# Patient Record
Sex: MALE | Race: WHITE | Employment: FULL TIME | URBAN - METROPOLITAN AREA
[De-identification: names, ages, dates, MRNs, and addresses within clinical notes are randomized per-mention and may not be internally consistent; named-entity substitution may affect disease eponyms.]

---

## 2017-12-09 ENCOUNTER — OFFICE VISIT (OUTPATIENT)
Dept: ORTHOPEDIC SURGERY | Age: 57
End: 2017-12-09

## 2017-12-09 VITALS
BODY MASS INDEX: 36.94 KG/M2 | SYSTOLIC BLOOD PRESSURE: 120 MMHG | HEIGHT: 71 IN | DIASTOLIC BLOOD PRESSURE: 79 MMHG | WEIGHT: 263.89 LBS | HEART RATE: 99 BPM

## 2017-12-09 DIAGNOSIS — M79.621 PAIN IN RIGHT UPPER ARM: Primary | ICD-10-CM

## 2017-12-09 PROCEDURE — 99203 OFFICE O/P NEW LOW 30 MIN: CPT | Performed by: ORTHOPAEDIC SURGERY

## 2017-12-09 RX ORDER — LAMOTRIGINE 25 MG/1
100 TABLET ORAL
COMMUNITY
Start: 2017-11-27

## 2017-12-09 RX ORDER — MELOXICAM 15 MG/1
15 TABLET ORAL DAILY
Qty: 30 TABLET | Refills: 0 | Status: SHIPPED | OUTPATIENT
Start: 2017-12-09 | End: 2018-01-18 | Stop reason: ALTCHOICE

## 2017-12-09 NOTE — PROGRESS NOTES
Date:  2017    Name:  Kristal Le  Address:  [address] [place] [address] [place]    :  1960      Age:   59 y.o.    SSN:  [id number]      Medical Record Number:  [id number]    Reason for Visit:    Chief Complaint    Shoulder Pain (Right arm pain) and Arm Pain      DOS:2017     HPI: Kristal Le is a 59 y.o. male here today for evaluation of his right shoulder. He has a 3 month history of right shoulder pain that radiates into his arm without any associated injuries. The pain hasworsened with time. The only treatment he's had is Aleve with only modest alleviation of his shoulder pain. The pain is severe enough that it wakes him up at night; he rates it at 8/10 and  Sharp, and worse with activities. He can no longer pull his belt through his belt loops and has difficulty putting on a coat. 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 He has a positive history of stents about 10 years ago but has never been told he should not take oral NSAIDs. He's been taking Aleve and has tolerated it well. He is not on any blood thinners. He was referred here by his good friend, Denzel Naranjo, who works at [employer]. Pain Assessment  Location of Pain: Arm  Location Modifiers: Right  Severity of Pain: 8  Quality of Pain: Aching, Sharp (pulling)  Duration of Pain: Persistent  Frequency of Pain: Constant  Aggravating Factors: Other (Comment) (raising arm cross body)  Limiting Behavior: Yes  Result of Injury: No  Work-Related Injury: No  Are there other pain locations you wish to document?: No  ROS: All systems reviewed on patient intake form. Pertinent items are noted in HPI.         Past Medical History:   Diagnosis Date    AB (asthmatic bronchitis) 2015    ADHD (attention deficit hyperactivity disorder)     BPH (benign prostatic hyperplasia)     CAD (coronary artery disease)     Depression     DM [facility]abetes mellitus) ([facility] 2015    HTN (hypertension)  Hyperlipidemia     PNA (pneumonia) 1/20/2015        Past Surgical History:   Procedure Laterality Date    CORONARY ANGIOPLASTY WITH STENT PLACEMENT      2007    DIAGNOSTIC CARDIAC CATH LAB PROCEDURE      UMBILICAL HERNIA REPAIR      2009       Family History   Problem Relation Age of Onset    Asthma Neg Hx     Cancer Neg Hx     Diabetes Neg Hx     Emphysema Neg Hx     Heart Failure Neg Hx     Hypertension Neg Hx        Social History     Social History    Marital status:      Spouse name: N/A    Number of children: N/A    Years of education: N/A     Social History Main Topics    Smoking status: Never Smoker    Smokeless tobacco: Never Used    Alcohol use Yes    Drug use: No    Sexual activity: Not Asked     Other Topics Concern    None     Social History Narrative    None       Current Outpatient Prescriptions   Medication Sig Dispense Refill    lamoTRIgine (LAMICTAL) 25 MG tablet       OLANZapine (ZYPREXA) 10 MG tablet TAKE ONE TABLET BY MOUTH EVERY NIGHT AT BEDTIME 30 tablet 0    fenofibrate 160 MG tablet Take 1 tablet by mouth daily 30 tablet 5    amphetamine-dextroamphetamine (ADDERALL, 20MG,) 20 MG tablet Take 1 tablet by mouth 2 times daily .  60 tablet 0    lisinopril-hydrochlorothiazide (PRINZIDE;ZESTORETIC) 20-12.5 MG per tablet TAKE TWO TABLETS BY MOUTH DAILY 180 tablet 2    metFORMIN (GLUCOPHAGE) 1000 MG tablet TAKE ONE TABLET BY MOUTH TWICE A  tablet 2    buPROPion (WELLBUTRIN SR) 150 MG extended release tablet TAKE ONE TABLET BY MOUTH TWICE A  tablet 2    simvastatin (ZOCOR) 20 MG tablet TAKE ONE TABLET BY MOUTH DAILY 90 tablet 2    TRINTELLIX 20 MG TABS tablet TAKE ONE TABLET BY MOUTH DAILY 90 tablet 2    ALPRAZolam (XANAX) 0.5 MG tablet TAKE ONE TABLET BY MOUTH THREE TIMES A DAY AS NEEDED 90 tablet 0    INVOKANA 100 MG TABS tablet TAKE ONE TABLET BY MOUTH DAILY 90 tablet 2    finasteride (PROSCAR) 5 MG tablet Take 1 tablet by mouth daily 30 testing in abduction in the scapular plane, 5/5 resisted external rotation with the elbow at the side, 5/5 resisted internal rotation with the elbow at the side    Stability:  no gross instability    Neurovascular: Neurovascularly intact      Diagnostics:  Radiology:     X-rays of the right shoulder including Grashey, scapular Y and axillary views were obtained and reviewed in office:    Impression: No reactive changes to the greater tuberosity or upwards migration of the humeral head. Overall unimpressive. Assessment/Plan: Mr. Conchita Washington is a pleasant 75-year-old male presenting today for evaluation of his right shoulder. He has a 3 month history of right shoulder pain with no associated injuries. He is taken Aleve with only mild alleviation of his pain. He can no longer perform some of his daily activities such as putting on his belt through his belt loops. The recommended plan at this time is for him to undergo an MRI to rule out a rotator cuff tear. For short-term pain relief a precision for meloxicam has been sent to his pharmacy. Risks and benefits of oral NSAIDs were discussed. He understands that if he has any stomach upset he should discontinue the medication and contact us as well as his primary care doctor. He should follow-up in early January to go over his MRI results and/or as needed. All questions were answered to patient's satisfaction and he was encouraged call with any further questions. Chasity Fleming is in full agreement with this plan. Discussion:  The patient was advised that NSAID-type medications have two very important potential side effects: gastrointestinal irritation including hemorrhage and renal injuries. He was asked to take the medication with food and to stop if he experiences any GI upset. I asked him to call for vomiting, abdominal pain or black/bloody stools. The patient expresses understanding of these issues and questions were answered.       Orders Placed This

## 2018-01-18 ENCOUNTER — OFFICE VISIT (OUTPATIENT)
Dept: ORTHOPEDIC SURGERY | Age: 58
End: 2018-01-18

## 2018-01-18 VITALS
BODY MASS INDEX: 36.94 KG/M2 | DIASTOLIC BLOOD PRESSURE: 61 MMHG | WEIGHT: 263.89 LBS | SYSTOLIC BLOOD PRESSURE: 96 MMHG | HEIGHT: 71 IN | HEART RATE: 68 BPM

## 2018-01-18 DIAGNOSIS — E11.618 ADHESIVE CAPSULITIS OF LEFT SHOULDER ASSOCIATED WITH TYPE 2 DIABETES MELLITUS (HCC): Primary | ICD-10-CM

## 2018-01-18 DIAGNOSIS — M75.02 ADHESIVE CAPSULITIS OF LEFT SHOULDER ASSOCIATED WITH TYPE 2 DIABETES MELLITUS (HCC): Primary | ICD-10-CM

## 2018-01-18 DIAGNOSIS — M25.511 RIGHT SHOULDER PAIN, UNSPECIFIED CHRONICITY: ICD-10-CM

## 2018-01-18 DIAGNOSIS — M79.621 PAIN IN RIGHT UPPER ARM: ICD-10-CM

## 2018-01-18 PROCEDURE — 99213 OFFICE O/P EST LOW 20 MIN: CPT | Performed by: ORTHOPAEDIC SURGERY

## 2018-01-18 PROCEDURE — 20610 DRAIN/INJ JOINT/BURSA W/O US: CPT | Performed by: ORTHOPAEDIC SURGERY

## 2018-01-18 NOTE — PROGRESS NOTES
Review of Systems   Musculoskeletal: Positive for joint pain.        Depomedrol     NDC#: 2459-3453-82  Lot: [id number]  Expiration Date: 04/20  Dose: 2cc   Location: injection was given in Right  Shoulder        Lido    NDC#: 1896-4298-22   Lot:-[id number]  Expiration Date: 5/19  Dose:8cc  Location: injection was given in Right shoulder
small rotator cuff tear and possible biceps tendinosis. Assessment :  Gilberto Gil is a pleasant 59 y.o. male with a diabetic frozen shoulder. Impression:  Encounter Diagnoses   Name Primary?  Pain in right upper arm     Right shoulder pain, unspecified chronicity     Adhesive capsulitis of left shoulder associated with type 2 diabetes mellitus ([address] Yes       Office Procedures:  Orders Placed This Encounter   Procedures    Amb External Referral To Physical Therapy     Referral Priority:   Routine     Referral Type:   Consult for Advice and Opinion     Referral Reason:   Patient Preference     Requested Specialty:   Physical Therapy     Number of Visits Requested:   1    [place] ARTHROCENTESIS ASPIR&/INJ MAJOR JT/BURSA W/O US    [place] DEPO MEDROL       Treatment Plan: We had a discussion with the patient about the MRI results. Based on our findings on the MRI images and our physical exam today, we do not believe that he has a rotator cuff tear or any structural damage. We would like for him to consult with his diabetic physician about a short term antiinflammatory. He took Mobic for about a month which did not help. We would recommend Diclofenac to take 2x per day. We will give him the prescription for this but have advised that he consult with his physician first to confirm it is safe to take. We are also recommending a cortisone injection into the right shoulder. He is in agreement with this plan. Procedure Note:  Risks and benefits of a corticosteroid injection were discussed with Gilberto Gil. 2cc of 40mg/ml of depo medrol and 8cc of 1% lidocaine were injected in the right glenohumeral joint following chlorhexidine prep. He tolerated the procedure well with no immediate adverse sequels after the injection. He should closely monitor his blood sugar over the next few days. He should start physical therapy at [facility] with Ange Avery to work on mobility and stretching.  A new physical therapy

## 2018-03-22 ENCOUNTER — OFFICE VISIT (OUTPATIENT)
Dept: ORTHOPEDIC SURGERY | Age: 58
End: 2018-03-22

## 2018-03-22 VITALS
HEIGHT: 71 IN | BODY MASS INDEX: 36.94 KG/M2 | HEART RATE: 71 BPM | SYSTOLIC BLOOD PRESSURE: 114 MMHG | WEIGHT: 263.89 LBS | DIASTOLIC BLOOD PRESSURE: 80 MMHG

## 2018-03-22 DIAGNOSIS — M25.511 RIGHT SHOULDER PAIN, UNSPECIFIED CHRONICITY: ICD-10-CM

## 2018-03-22 DIAGNOSIS — M75.02 ADHESIVE CAPSULITIS OF LEFT SHOULDER ASSOCIATED WITH TYPE 2 DIABETES MELLITUS (HCC): Primary | ICD-10-CM

## 2018-03-22 DIAGNOSIS — E11.618 ADHESIVE CAPSULITIS OF LEFT SHOULDER ASSOCIATED WITH TYPE 2 DIABETES MELLITUS (HCC): Primary | ICD-10-CM

## 2018-03-22 DIAGNOSIS — M79.621 PAIN IN RIGHT UPPER ARM: ICD-10-CM

## 2018-03-22 PROCEDURE — 99213 OFFICE O/P EST LOW 20 MIN: CPT | Performed by: ORTHOPAEDIC SURGERY

## 2023-07-19 ENCOUNTER — TELEPHONE (OUTPATIENT)
Age: 63
End: 2023-07-19

## 2023-08-02 ENCOUNTER — TELEPHONE (OUTPATIENT)
Age: 63
End: 2023-08-02

## 2023-08-02 NOTE — TELEPHONE ENCOUNTER
Pt was no show for npt appt ref by Rubia Bailey, dementia, and forgetfulness on 8/2/23 with Dr. Jose Coleman. This is pt first no show. Spoke to pt on phone on 8/1/23 to offer sooner appt. pt was not certain he wanted to keep appt, tired of drs, pills, testing.